# Patient Record
Sex: MALE | Race: WHITE | NOT HISPANIC OR LATINO | URBAN - NONMETROPOLITAN AREA
[De-identification: names, ages, dates, MRNs, and addresses within clinical notes are randomized per-mention and may not be internally consistent; named-entity substitution may affect disease eponyms.]

---

## 2019-03-08 ENCOUNTER — IMPORTED ENCOUNTER (OUTPATIENT)
Dept: URBAN - NONMETROPOLITAN AREA CLINIC 1 | Facility: CLINIC | Age: 59
End: 2019-03-08

## 2019-03-08 PROBLEM — H25.13: Noted: 2019-03-08

## 2019-03-08 PROBLEM — H52.4: Noted: 2019-03-08

## 2019-03-08 PROBLEM — H52.13: Noted: 2019-03-08

## 2019-03-08 PROCEDURE — 92015 DETERMINE REFRACTIVE STATE: CPT

## 2019-03-08 PROCEDURE — 92014 COMPRE OPH EXAM EST PT 1/>: CPT

## 2019-03-08 NOTE — PATIENT DISCUSSION
Simple Astigmatism OD/Mixed Astigmatism OS w/Presbyopia-  discussed findings w/patient-  new spectacle Rx issued ok to fill as NVO-  monitor yearly or prn Nuclear Sclerosis OU -  discussed findings w/patient-  no treatment indicated at this time-  UV protection recommended-  monitor yearly or prn; 's Notes: MR 3/8/2019DFE 3/8/2019

## 2020-08-19 ENCOUNTER — IMPORTED ENCOUNTER (OUTPATIENT)
Dept: URBAN - NONMETROPOLITAN AREA CLINIC 1 | Facility: CLINIC | Age: 60
End: 2020-08-19

## 2020-08-19 PROCEDURE — 92014 COMPRE OPH EXAM EST PT 1/>: CPT

## 2020-08-19 PROCEDURE — 92015 DETERMINE REFRACTIVE STATE: CPT

## 2020-08-19 NOTE — PATIENT DISCUSSION
Simple Astigmatism OD/Mixed Astigmatism OS w/Presbyopia-  discussed findings w/patient-  new spectacle Rx issued ok to fill as NVO-  monitor yearly or prn Nuclear Sclerosis OU -  discussed findings w/patient-  no treatment indicated at this time-  UV protection recommended-  monitor yearly or prn; 's Notes: MR 8/19/2020DFE 8/19/2020

## 2021-10-08 ENCOUNTER — IMPORTED ENCOUNTER (OUTPATIENT)
Dept: URBAN - NONMETROPOLITAN AREA CLINIC 1 | Facility: CLINIC | Age: 61
End: 2021-10-08

## 2021-10-08 PROCEDURE — 92015 DETERMINE REFRACTIVE STATE: CPT

## 2021-10-08 PROCEDURE — 92014 COMPRE OPH EXAM EST PT 1/>: CPT

## 2021-10-08 NOTE — PATIENT DISCUSSION
Simple Astigmatism OD/Mixed Astigmatism OS w/Presbyopia-  discussed findings w/patient-  new spectacle Rx issued ok to fill as NVO-  monitor yearly or prn Nuclear Sclerosis OU -  discussed findings w/patient-  no treatment indicated at this time-  UV protection recommended-  monitor yearly or prn; 's Notes:  10/8/2021DFE 10/8/2021

## 2022-04-09 ASSESSMENT — VISUAL ACUITY
OS_SC: 20/20
OU_SC: 20/40
OU_CC: 20/20
OS_SC: 20/20
OU_SC: 20/20
OD_CC: 20/20-
OD_CC: 20/20
OS_CC: 20/20
OD_SC: 20/20
OU_CC: 20/20
OU_SC: J2
OS_CC: 20/20

## 2022-04-09 ASSESSMENT — TONOMETRY
OD_IOP_MMHG: 16
OS_IOP_MMHG: 16
OD_IOP_MMHG: 16
OS_IOP_MMHG: 16
OS_IOP_MMHG: 16
OD_IOP_MMHG: 16

## 2024-02-15 ENCOUNTER — COMPREHENSIVE EXAM (OUTPATIENT)
Dept: URBAN - NONMETROPOLITAN AREA CLINIC 4 | Facility: CLINIC | Age: 64
End: 2024-02-15

## 2024-02-15 DIAGNOSIS — H52.4: ICD-10-CM

## 2024-02-15 DIAGNOSIS — H52.13: ICD-10-CM

## 2024-02-15 PROCEDURE — 92015 DETERMINE REFRACTIVE STATE: CPT

## 2024-02-15 PROCEDURE — 92014 COMPRE OPH EXAM EST PT 1/>: CPT

## 2024-02-15 ASSESSMENT — VISUAL ACUITY
OS_SC: 20/20
OD_SC: 20/30+1

## 2024-02-15 ASSESSMENT — TONOMETRY
OD_IOP_MMHG: 14
OS_IOP_MMHG: 16

## 2025-06-09 ENCOUNTER — COMPREHENSIVE EXAM (OUTPATIENT)
Age: 65
End: 2025-06-09

## 2025-06-09 DIAGNOSIS — H52.13: ICD-10-CM

## 2025-06-09 DIAGNOSIS — H25.13: ICD-10-CM

## 2025-06-09 DIAGNOSIS — H16.223: ICD-10-CM

## 2025-06-09 DIAGNOSIS — H52.4: ICD-10-CM

## 2025-06-09 PROCEDURE — 92014 COMPRE OPH EXAM EST PT 1/>: CPT

## 2025-06-09 PROCEDURE — 92015 DETERMINE REFRACTIVE STATE: CPT
